# Patient Record
Sex: FEMALE | Race: WHITE | NOT HISPANIC OR LATINO | Employment: UNEMPLOYED
[De-identification: names, ages, dates, MRNs, and addresses within clinical notes are randomized per-mention and may not be internally consistent; named-entity substitution may affect disease eponyms.]

---

## 2018-08-25 ENCOUNTER — HEALTH MAINTENANCE LETTER (OUTPATIENT)
Age: 38
End: 2018-08-25

## 2019-09-28 ENCOUNTER — HEALTH MAINTENANCE LETTER (OUTPATIENT)
Age: 39
End: 2019-09-28
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
## 2020-07-21 ENCOUNTER — VIRTUAL VISIT (OUTPATIENT)
Dept: FAMILY MEDICINE | Facility: CLINIC | Age: 40
End: 2020-07-21
Payer: COMMERCIAL

## 2020-07-21 DIAGNOSIS — G25.81 RESTLESS LEG SYNDROME: ICD-10-CM

## 2020-07-21 DIAGNOSIS — F11.20 HEROIN ADDICTION (H): Primary | ICD-10-CM

## 2020-07-21 DIAGNOSIS — F11.23 OPIOID DEPENDENCE WITH WITHDRAWAL (H): ICD-10-CM

## 2020-07-21 DIAGNOSIS — J45.31 MILD PERSISTENT ASTHMA WITH ACUTE EXACERBATION: ICD-10-CM

## 2020-07-21 PROCEDURE — 99203 OFFICE O/P NEW LOW 30 MIN: CPT | Mod: 95 | Performed by: NURSE PRACTITIONER

## 2020-07-21 RX ORDER — GABAPENTIN 300 MG/1
300 CAPSULE ORAL 3 TIMES DAILY PRN
Qty: 60 CAPSULE | Refills: 0 | Status: SHIPPED | OUTPATIENT
Start: 2020-07-21

## 2020-07-21 RX ORDER — ALBUTEROL SULFATE 90 UG/1
2 AEROSOL, METERED RESPIRATORY (INHALATION) EVERY 6 HOURS
Qty: 36 G | Refills: 0 | Status: SHIPPED | OUTPATIENT
Start: 2020-07-21

## 2020-07-21 RX ORDER — FLUTICASONE PROPIONATE 110 UG/1
1 AEROSOL, METERED RESPIRATORY (INHALATION) 2 TIMES DAILY
Qty: 12 G | Refills: 2 | Status: SHIPPED | OUTPATIENT
Start: 2020-07-21

## 2020-07-21 RX ORDER — ONDANSETRON 4 MG/1
4 TABLET, FILM COATED ORAL EVERY 8 HOURS PRN
Qty: 30 TABLET | Refills: 0 | Status: SHIPPED | OUTPATIENT
Start: 2020-07-21

## 2020-07-21 NOTE — PATIENT INSTRUCTIONS
Start gabapentin  Try zofran for nausea  Schedule with addiction medicine   Follow-up in four weeks with me for asthma

## 2020-07-22 ENCOUNTER — TELEPHONE (OUTPATIENT)
Dept: ADDICTION MEDICINE | Facility: CLINIC | Age: 40
End: 2020-07-22

## 2020-07-22 NOTE — TELEPHONE ENCOUNTER
Please review referral. Please route back to writer.     Thank you,    Kiana Melo    [facility] [place]

## 2020-07-22 NOTE — TELEPHONE ENCOUNTER
"Please schedule appointment for patient with  Addiction Medicine Provider   For opioid use     Our staff will relay the following information: Please offer our patient to call [place]'s assessment line - [phone]. They can ask for a \"chemical assessment\" or \"rule 25\". This will allow them to discuss possible psychosocial treatment options including individual therapy or any group options including outpatient, intensive outpatient, or residential (inpatient) treatment.     Note routed to PCP - Please feel free to reach out to myself with any specific patient care needs as well. I can help address concerns and/or relay the information to the provider who will be scheduled. No need to reply if consult is sufficient.     Ermias Lewis MD    "

## 2020-07-24 NOTE — TELEPHONE ENCOUNTER
Writer attempted to reach pt; no answer. LVM requesting a call back for an appt. Two more attempts will be made.     Kiana Melo    [facility]

## 2020-07-27 NOTE — TELEPHONE ENCOUNTER
Writer attempted to reach pt; no answer. LVM requesting a call back for an appt. One more attempt will be made.     Kiana Melo    [facility]

## 2020-08-04 NOTE — TELEPHONE ENCOUNTER
Third and final attempt to reach pt; no answer. LVM requesting a call back for an appt.   Writer is routing this encounter to referring provider. Please inform pt that we tried to reach her to get her scheduled with an addiction medicine provider. Please have pt call us back if she is still interested in being seen at [facility]. [phone]. Writer is closing this encounter, no further action needed.     Thank you,  Kiana Melo    [facility]

## 2021-01-10 ENCOUNTER — HEALTH MAINTENANCE LETTER (OUTPATIENT)
Age: 41
End: 2021-01-10

## 2021-10-23 ENCOUNTER — HEALTH MAINTENANCE LETTER (OUTPATIENT)
Age: 41
End: 2021-10-23

## 2022-02-12 ENCOUNTER — HEALTH MAINTENANCE LETTER (OUTPATIENT)
Age: 42
End: 2022-02-12

## 2022-10-10 ENCOUNTER — HEALTH MAINTENANCE LETTER (OUTPATIENT)
Age: 42
End: 2022-10-10

## 2023-02-18 ENCOUNTER — HEALTH MAINTENANCE LETTER (OUTPATIENT)
Age: 43
End: 2023-02-18

## 2023-06-06 NOTE — TELEPHONE ENCOUNTER
----- Message from [employer] sent at 6/5/2023  8:46 AM EDT -----  Regarding: FW: Change appt to TCM    ----- Message -----  From: Francesca Scott PA-C  Sent: 6/1/2023   5:21 PM EDT  To: [facility]  Subject: Change appt to TCM                               Hi! Patient is scheduled with me on 6/6/23 at 1:00 PM for 20 minute visit  Patient was recently in hospital from 5/20/23- 5/24/23, so patient is in need of 40 minutes hospital follow up visit  Can you either please switch his visit to 40 minutes or r/s patient to another time? Thanks!     Kay Patient prefers to see Dr. Warner, please attempt to schedule with her primarily.    Ermias Lewis MD

## 2024-03-16 ENCOUNTER — HEALTH MAINTENANCE LETTER (OUTPATIENT)
Age: 44
End: 2024-03-16